# Patient Record
Sex: MALE | Race: WHITE | Employment: UNEMPLOYED | URBAN - METROPOLITAN AREA
[De-identification: names, ages, dates, MRNs, and addresses within clinical notes are randomized per-mention and may not be internally consistent; named-entity substitution may affect disease eponyms.]

---

## 2023-02-17 ENCOUNTER — HOSPITAL ENCOUNTER (EMERGENCY)
Age: 33
Discharge: HOME | End: 2023-02-17
Attending: EMERGENCY MEDICINE

## 2023-02-17 VITALS
DIASTOLIC BLOOD PRESSURE: 96 MMHG | SYSTOLIC BLOOD PRESSURE: 119 MMHG | BODY MASS INDEX: 22.73 KG/M2 | TEMPERATURE: 98.3 F | HEIGHT: 68 IN | OXYGEN SATURATION: 100 % | HEART RATE: 83 BPM | RESPIRATION RATE: 16 BRPM | WEIGHT: 150 LBS

## 2023-02-17 DIAGNOSIS — J06.9 ACUTE UPPER RESPIRATORY INFECTION: Primary | ICD-10-CM

## 2023-02-17 LAB — SARS-COV-2, NAAT: NOT DETECTED

## 2023-02-17 PROCEDURE — 87635 SARS-COV-2 COVID-19 AMP PRB: CPT

## 2023-02-17 PROCEDURE — 99283 EMERGENCY DEPT VISIT LOW MDM: CPT

## 2023-02-17 ASSESSMENT — ENCOUNTER SYMPTOMS
GASTROINTESTINAL NEGATIVE: 1
COUGH: 1
SHORTNESS OF BREATH: 0
EYES NEGATIVE: 1

## 2023-02-17 ASSESSMENT — PAIN - FUNCTIONAL ASSESSMENT: PAIN_FUNCTIONAL_ASSESSMENT: NONE - DENIES PAIN

## 2023-02-17 NOTE — ED TRIAGE NOTES
Pt states he has been around people who have tested positive for COVID and would like a COVID test. C/o fevers and chills for the past few days

## 2023-02-17 NOTE — DISCHARGE INSTRUCTIONS
Drink plenty of clear fluids. Use Tylenol or ibuprofen as needed for fever or pain. Follow-up with a primary care provider of your choice for repeat evaluation and further testing if your symptoms continue.

## 2023-02-17 NOTE — ED PROVIDER NOTES
[id number]          ATTENDING PHYSICIAN NOTE       Date of evaluation: 2/17/2023    Chief Complaint     Covid Testing (Pt states he has been around people who have tested positive for COVID and would like a COVID test. C/o fevers and chills for the past few days )      History of Present Illness     Kimberly Garza is a 28 y.o. male who presents to the emergency department requesting testing for coronavirus. Patient states that over the last 3 days he has been exposed to people who have been positive for coronavirus. He states over that time. He has had a sore throat and nonproductive cough. He does report fevers at home as high as 101. He denies any nausea, vomiting, or diarrhea. ASSESSMENT / PLAN  (MEDICAL DECISION MAKING)     INITIAL VITALS: BP: (!) 119/96, Temp: 98.3 °F (36.8 °C), Heart Rate: 83, Resp: 16, SpO2: 100 %      Kimberly Garza is a 28 y.o. male presents complaining of fever, cough, and congestion. Patient states that he has had exposure to coronavirus and was concerned that he may have this. On arrival, patient is hemodynamically stable and afebrile. He has clear breath sounds and normal heart sounds. He has no oropharyngeal erythema noted. Rapid COVID swab was negative. I feel most likely patient's symptoms are secondary to a viral upper respiratory tract infection. He has no indication for antibiotics since there is no evidence of bacterial infection on exam.  Patient will be instructed to do supportive care and follow-up with a primary care provider of his choice. Medical Decision Making  Problems Addressed:  Acute upper respiratory infection: acute illness or injury    Risk  OTC drugs. Clinical Impression     1.  Acute upper respiratory infection        Disposition     PATIENT REFERRED TO:  Primary care provider of your choice          DISCHARGE MEDICATIONS:  New Prescriptions    No medications on file       DISPOSITION Decision To Discharge 02/17/2023 03:13:16 AM        Diagnostic Results and Other Data       RADIOLOGY:  No orders to display       LABS:   Results for orders placed or performed during the hospital encounter of 02/17/23   COVID-19, Rapid    Specimen: Nasopharyngeal Swab   Result Value Ref Range    SARS-CoV-2, NAAT Not Detected Not Detected     ED BEDSIDE ULTRASOUND:  No results found. MOST RECENT VITALS:  BP: (!) 119/96,Temp: 98.3 °F (36.8 °C), Heart Rate: 83, Resp: 16, SpO2: 100 %     Procedures     N/A    ED Course     Nursing Notes, Past Medical Hx, Past Surgical Hx, Social Hx,Allergies, and Family Hx were reviewed. The patient was given the following medications:  No orders of the defined types were placed in this encounter. CONSULTS:  None    Review of Systems     Review of Systems   Constitutional:  Positive for fever. HENT: Negative. Eyes: Negative. Respiratory:  Positive for cough. Negative for shortness of breath. Cardiovascular: Negative. Gastrointestinal: Negative. Genitourinary: Negative. Musculoskeletal: Negative. Neurological: Negative. All other systems reviewed and are negative. Past Medical, Surgical, Family, and Social History     He has no past medical history on file. He has no past surgical history on file. His family history is not on file. He     Medications     Previous Medications    No medications on file       Allergies     He is allergic to penicillins. Physical Exam     INITIAL VITALS: BP: (!) 119/96, Temp: 98.3 °F (36.8 °C), Heart Rate: 83, Resp: 16, SpO2: 100 %   Physical Exam  Vitals and nursing note reviewed. Constitutional:       General: He is not in acute distress. HENT:      Head: Normocephalic and atraumatic. Mouth/Throat:      Mouth: Mucous membranes are moist.      Pharynx: No oropharyngeal exudate. Eyes:      General: No scleral icterus. Extraocular Movements: Extraocular movements intact. Conjunctiva/sclera: Conjunctivae normal.      Pupils: Pupils are equal, round, and reactive to light. Cardiovascular:      Rate and Rhythm: Normal rate and regular rhythm. Heart sounds: Normal heart sounds. Pulmonary:      Effort: Pulmonary effort is normal.      Breath sounds: Normal breath sounds. No wheezing, rhonchi or rales. Musculoskeletal:      Cervical back: Normal range of motion and neck supple. Neurological:      General: No focal deficit present. Mental Status: He is alert and oriented to person, place, and time. Cranial Nerves: No cranial nerve deficit. Motor: No weakness.       Coordination: Coordination normal.                  Corby Phelan MD  02/17/23 [id number]

## 2023-02-17 NOTE — LETTER
[facility] Emergency [facility]  [place] [id number]  Phone: [phone]  Fax: [phone]               February 17, 2023    Patient: Gwendolyn Hoskins   YOB: 1990   Date of Visit: 2/17/2023       To Whom It May Concern:    Donte Olson was seen and treated in our emergency department on 2/17/2023. He may return to work on 02/17/2023.       Sincerely,               Signature:__________________________________